# Patient Record
Sex: MALE
[De-identification: names, ages, dates, MRNs, and addresses within clinical notes are randomized per-mention and may not be internally consistent; named-entity substitution may affect disease eponyms.]

---

## 2018-12-09 ENCOUNTER — HOSPITAL ENCOUNTER (EMERGENCY)
Dept: HOSPITAL 31 - C.ER | Age: 38
Discharge: HOME | End: 2018-12-09
Payer: MEDICAID

## 2018-12-09 VITALS — RESPIRATION RATE: 20 BRPM

## 2018-12-09 VITALS
HEART RATE: 85 BPM | TEMPERATURE: 98.3 F | DIASTOLIC BLOOD PRESSURE: 85 MMHG | SYSTOLIC BLOOD PRESSURE: 136 MMHG | OXYGEN SATURATION: 98 %

## 2018-12-09 DIAGNOSIS — Z72.0: ICD-10-CM

## 2018-12-09 DIAGNOSIS — J01.90: Primary | ICD-10-CM

## 2018-12-09 LAB
ALBUMIN SERPL-MCNC: 4.5 G/DL (ref 3.5–5)
ALBUMIN/GLOB SERPL: 1.2 {RATIO} (ref 1–2.1)
ALT SERPL-CCNC: 27 U/L (ref 21–72)
AST SERPL-CCNC: 30 U/L (ref 17–59)
BASOPHILS # BLD AUTO: 0.1 K/UL (ref 0–0.2)
BASOPHILS NFR BLD: 1 % (ref 0–2)
BUN SERPL-MCNC: 9 MG/DL (ref 9–20)
CALCIUM SERPL-MCNC: 9.3 MG/DL (ref 8.6–10.4)
EOSINOPHIL # BLD AUTO: 0.4 K/UL (ref 0–0.7)
EOSINOPHIL NFR BLD: 5.2 % (ref 0–4)
ERYTHROCYTE [DISTWIDTH] IN BLOOD BY AUTOMATED COUNT: 14 % (ref 11.5–14.5)
GFR NON-AFRICAN AMERICAN: > 60
HGB BLD-MCNC: 15.9 G/DL (ref 12–18)
LYMPHOCYTES # BLD AUTO: 1.9 K/UL (ref 1–4.3)
LYMPHOCYTES NFR BLD AUTO: 25.8 % (ref 20–40)
MCH RBC QN AUTO: 27.2 PG (ref 27–31)
MCHC RBC AUTO-ENTMCNC: 33.4 G/DL (ref 33–37)
MCV RBC AUTO: 81.5 FL (ref 80–94)
MONOCYTES # BLD: 0.9 K/UL (ref 0–0.8)
MONOCYTES NFR BLD: 12.9 % (ref 0–10)
NEUTROPHILS # BLD: 4 K/UL (ref 1.8–7)
NEUTROPHILS NFR BLD AUTO: 55.1 % (ref 50–75)
NRBC BLD AUTO-RTO: 0 % (ref 0–2)
PLATELET # BLD: 241 K/UL (ref 130–400)
PMV BLD AUTO: 8.7 FL (ref 7.2–11.7)
RBC # BLD AUTO: 5.82 MIL/UL (ref 4.4–5.9)
WBC # BLD AUTO: 7.3 K/UL (ref 4.8–10.8)

## 2018-12-09 PROCEDURE — 99284 EMERGENCY DEPT VISIT MOD MDM: CPT

## 2018-12-09 PROCEDURE — 80053 COMPREHEN METABOLIC PANEL: CPT

## 2018-12-09 PROCEDURE — 87804 INFLUENZA ASSAY W/OPTIC: CPT

## 2018-12-09 PROCEDURE — 85025 COMPLETE CBC W/AUTO DIFF WBC: CPT

## 2018-12-09 PROCEDURE — 70481 CT ORBIT/EAR/FOSSA W/DYE: CPT

## 2018-12-09 NOTE — CT
Date of service: 



12/09/2018



PROCEDURE:  CT ORBITS WITH CONTRAST.



HISTORY:

Rt periobit edema;r/o orbit celluitis vs sinusitis



COMPARISON:

None available.



TECHNIQUE:

Following administration of intravenous iodinated contrast, axial CT 

images of the orbits were obtained. Coronal and sagittal reformats 

were generated.



Intravenous contrast dose: 100 mL Visipaque



Radiation dose:



Total exam DLP = 564.76 mGy-cm.



This CT exam was performed using one or more of the following dose 

reduction techniques: Automated exposure control, adjustment of the 

mA and/or kV according to patient size, and/or use of iterative 

reconstruction technique.



FINDINGS:



RIGHT ORBIT:



RIGHT BONY ORBIT:

Normal.



RIGHT INTRAORBITAL STRUCTURES:

Globe: Normal.



Extraocular muscles: Normal.



Post septal space: Normal.



Optic Nerve: Normal.



Lacrimal Apparatus: Normal.



RIGHT PRESEPTAL SOFT TISSUES:

There is mild infraorbital and pre maxillary.



LEFT ORBIT:



LEFT BONY ORBIT:

Normal.



LEFT INTRAORBITAL STRUCTURES:

Globe: Normal.



Extraocular muscles: Normal.



Post septal space: Normal.



Optic Nerve: Normal.



Lacrimal Apparatus: Normal.



LEFT PRESEPTAL SOFT TISSUES:

Normal.



OTHER:

There is moderate polypoid mucosal thickening in the left maxillary 

sinus with aerosolized secretions, moderate mucosal thickening in the 

ethmoid air cells and mild polypoid mucosal thickening in the left 

frontal and left sphenoid sinuses.  There is also mild polypoid 

mucosal thickening in the right maxillary sinus. 



IMPRESSION:

1.  Mild right infraorbital and pre maxillary soft tissue swelling 

which may represent periorbital cellulitis in the appropriate 

clinical setting.  No evidence for postseptal orbital cellulitis.  

Mild chronic right maxillary sinusitis. 



2.  Acute and/chronic left maxillary, ethmoid and frontal sinusitis 

in ostiomeatal obstruction pattern.

## 2018-12-09 NOTE — C.PDOC
History Of Present Illness





Patient is a 38 yr old male who is c/o flu-like symptoms throughout the week and

also c/o pain under his right eye since yesterday.  Pt states he has had no 

energy and has been very stuffy (nasal congestion) thoughout the whole week.  He

states he has felt feverish the whole week but didn't check his temperature at 

home.  Pt did not get a flu shot this yr.  Pt did have a cough a few days ago 

but that is better now.  Pt also w/ a sore throat a couple of days ago but 

that's improved.  The pain located at his right inferior orbit does radiate 

inferiorly to the maxillary sinus area.  No drainage of pus from his right eye 

and did not wake up with crust at right eye (and eyelids not stuck together upon

wakening). Pt has been taking Tylenol throughout the week w/o much 

effectiveness.  Did not take Tylenol today.





PMD:  None





/


Time Seen by Provider: 12/09/18 12:41


Chief Complaint (Nursing): Flu-like Symptoms


History Per: Patient


History/Exam Limitations: no limitations


Onset/Duration Of Symptoms: Days


Current Symptoms Are (Timing): Still Present





Past Medical History


Reviewed: Historical Data, Nursing Documentation, Vital Signs


Vital Signs: 





                                Last Vital Signs











Temp  98.1 F   12/09/18 12:34


 


Pulse  87   12/09/18 12:34


 


Resp  20   12/09/18 12:34


 


BP  120/82   12/09/18 12:34


 


Pulse Ox  99   12/09/18 12:34














- Medical History


PMH: Pneumonia


Other PMH: Headaches


Family History: States: Diabetes, Other


Other Family History: Cancer





- Social History


Hx Tobacco Use: Yes


Hx Alcohol Use: No


Hx Substance Use: No





Review Of Systems


Except As Marked, All Systems Reviewed And Found Negative.


Constitutional: Positive for: Fever


Eyes: Positive for: Pain


Cardiovascular: Negative for: Chest Pain


Respiratory: Positive for: Cough


Gastrointestinal: Negative for: Vomiting


Neurological: Positive for: Weakness





Physical Exam





- Physical Exam


Appears: Well, Non-toxic, Toxic


Skin: Other (erythema to right lower periorbital area)


Head: Atraumatic


Eye(s): bilateral: PERRL, EOMI, right: Eyelid Inflammation (lower eyelid 

inflamed), Other ((+) right lower periorbital edema and right inf bony orbital 

rim is very tender to touch)


Ear(s): Bilateral: Normal


Nose: Other ((+) edema of nasal mucosa)


Oral Mucosa: Moist


Tongue: Normal Appearing


Lips: Normal Appearing


Teeth: Normal Dentition


Gingiva: Normal Appearing


Throat: Normal


Neck: Normal


Lymphatic: Deferred


Chest: Symmetrical


Cardiovascular: Rhythm Regular


Respiratory: Normal Breath Sounds, No Rales, No Rhonchi, No Wheezing


Gastrointestinal/Abdominal: Normal Exam, Bowel Sounds, Soft, No Tenderness


Rectal: Deferred


Back: Normal Inspection


Extremity: Normal ROM


Extremity: Bilateral: Normal ROM


Pulses: Left Radial: Normal, Right Radial: Normal


Neurological/Psych: Oriented x3, Normal Motor





ED Course And Treatment





- Laboratory Results


Result Diagrams: 


                                 12/09/18 13:06





                                 12/09/18 13:06


O2 Sat by Pulse Oximetry: 99





Medical Decision Making


Medical Decision Making: 





Initial Impression:  


Right orbital swelling/pain.  Differential diagnosis includes but is not limited

to:  periorbital cellulitis, orbital cellulitis, sinusitis, influenza





Initial Plan:


Will get labs and check CT














Disposition


Counseled Patient/Family Regarding: Studies Performed, Diagnosis, Need For 

Followup, Rx Given





- Disposition


Referrals: 


Behin,Babak, MD [Staff Provider] - 


Disposition: HOME/ ROUTINE


Disposition Time: 14:56


Condition: STABLE


Additional Instructions: 


Mr. Lopez, thank you for letting us take care of you today.





Return to the ER if your symptoms worsen, or if any problems.





Take the medication listed below as prescribed.





Follow up with the Ear-Nose-Throat doctor (Dr. Behin).  His phone number is 

listed below to make an appointment.








Prescriptions: 


Amoxicillin/Clavulanate [Augmentin 875 MG-125 MG] 1 tab PO BID #20 tab


Ibuprofen [Motrin Tab] 1 tab PO TID PRN #30 tab


 PRN Reason: Pain, Moderate (4-7)


Instructions:  Sinusitis in Adults


Forms:  [employer] (English)


Print Language: ENGLISH





- POA


Present On Arrival: None





- Clinical Impression


Clinical Impression: 


 Sinusitis, acute

## 2018-12-10 ENCOUNTER — HOSPITAL ENCOUNTER (EMERGENCY)
Dept: HOSPITAL 31 - C.ER | Age: 38
Discharge: HOME | End: 2018-12-10
Payer: MEDICAID

## 2018-12-10 VITALS — SYSTOLIC BLOOD PRESSURE: 121 MMHG | HEART RATE: 87 BPM | OXYGEN SATURATION: 98 % | DIASTOLIC BLOOD PRESSURE: 74 MMHG

## 2018-12-10 VITALS — BODY MASS INDEX: 30.1 KG/M2

## 2018-12-10 VITALS — TEMPERATURE: 98.1 F | RESPIRATION RATE: 18 BRPM

## 2018-12-10 DIAGNOSIS — L03.213: Primary | ICD-10-CM

## 2018-12-10 LAB
BASOPHILS # BLD AUTO: 0.1 K/UL (ref 0–0.2)
BASOPHILS NFR BLD: 1.1 % (ref 0–2)
BUN SERPL-MCNC: 10 MG/DL (ref 9–20)
CALCIUM SERPL-MCNC: 9.3 MG/DL (ref 8.6–10.4)
EOSINOPHIL # BLD AUTO: 0.6 K/UL (ref 0–0.7)
EOSINOPHIL NFR BLD: 9.9 % (ref 0–4)
ERYTHROCYTE [DISTWIDTH] IN BLOOD BY AUTOMATED COUNT: 14.2 % (ref 11.5–14.5)
GFR NON-AFRICAN AMERICAN: > 60
HGB BLD-MCNC: 15.6 G/DL (ref 12–18)
LYMPHOCYTES # BLD AUTO: 1.7 K/UL (ref 1–4.3)
LYMPHOCYTES NFR BLD AUTO: 30.8 % (ref 20–40)
MCH RBC QN AUTO: 26.8 PG (ref 27–31)
MCHC RBC AUTO-ENTMCNC: 32.6 G/DL (ref 33–37)
MCV RBC AUTO: 82.1 FL (ref 80–94)
MONOCYTES # BLD: 0.9 K/UL (ref 0–0.8)
MONOCYTES NFR BLD: 15.5 % (ref 0–10)
NEUTROPHILS # BLD: 2.4 K/UL (ref 1.8–7)
NEUTROPHILS NFR BLD AUTO: 42.7 % (ref 50–75)
NRBC BLD AUTO-RTO: 0.1 % (ref 0–2)
PLATELET # BLD: 235 K/UL (ref 130–400)
PMV BLD AUTO: 8.8 FL (ref 7.2–11.7)
RBC # BLD AUTO: 5.83 MIL/UL (ref 4.4–5.9)
WBC # BLD AUTO: 5.6 K/UL (ref 4.8–10.8)

## 2018-12-10 PROCEDURE — 96374 THER/PROPH/DIAG INJ IV PUSH: CPT

## 2018-12-10 PROCEDURE — 80048 BASIC METABOLIC PNL TOTAL CA: CPT

## 2018-12-10 PROCEDURE — 87040 BLOOD CULTURE FOR BACTERIA: CPT

## 2018-12-10 PROCEDURE — 96375 TX/PRO/DX INJ NEW DRUG ADDON: CPT

## 2018-12-10 PROCEDURE — 99283 EMERGENCY DEPT VISIT LOW MDM: CPT

## 2018-12-10 PROCEDURE — 85025 COMPLETE CBC W/AUTO DIFF WBC: CPT

## 2018-12-10 NOTE — C.PDOC
History Of Present Illness





37 yo male w/o significant PMhx come in for evaluation of Right side eye pain 

associated with periorbital swelling and redness gradually developed for past 3 

days. Noted some yellow/greenish discharge. Pt admits, was seen here yesterday, 

received on Rx: Augmentin, denies taking yet. Otherwise, pt denies fever, 

chills, headache, dizziness, blurry vision, pain on eye movement, denies neck 

pain, drooling, toothache, earache, denies contact use, denies known eye trauma 

or injury, FB sensation, CP, SOB, cough, denies any other active complaints. 

Ambulate to Ed, not in any apparent distress.


Time Seen by Provider: 12/10/18 14:16


Chief Complaint (Nursing): Eye Problem


History Per: Patient





Past Medical History


Reviewed: Historical Data, Nursing Documentation, Vital Signs


Vital Signs: 





                                Last Vital Signs











Temp  98.1 F   12/10/18 13:24


 


Pulse  89   12/10/18 13:24


 


Resp  18   12/10/18 13:24


 


BP  112/79   12/10/18 13:24


 


Pulse Ox  97   12/10/18 13:24














- Medical History


PMH: No Chronic Diseases, Pneumonia


   Denies: Chronic Kidney Disease


Surgical History: No Surg Hx





- [facility] Procedures











INTRODUCE OF OTH THERAP SUBST INTO RESP TRACT, VIA OPENING (11/03/17)








Family History: States: Diabetes


   Denies: CAD





- Social History


Hx Tobacco Use: Yes


Hx Alcohol Use: Yes


Hx Substance Use: Yes





- Immunization History


Hx Tetanus Toxoid Vaccination: Yes


Hx Influenza Vaccination: No


Hx Pneumococcal Vaccination: No





Review Of Systems


Except As Marked, All Systems Reviewed And Found Negative.


Constitutional: Negative for: Fever, Chills


Eyes: Positive for: Eyelid Inflammation, Redness.  Negative for: Vision Change


ENT: Negative for: Ear Discharge, Nose Discharge, Nose Congestion, Mouth 

Swelling


Cardiovascular: Negative for: Chest Pain


Respiratory: Negative for: Cough, Shortness of Breath, Wheezing


Gastrointestinal: Negative for: Nausea, Vomiting, Abdominal Pain


Genitourinary: Negative for: Dysuria


Musculoskeletal: Negative for: Neck Pain, Back Pain


Skin: Negative for: Rash


Neurological: Negative for: Weakness, Numbness, Altered Mental Status, Dizziness





Physical Exam





- Physical Exam


Appears: Well, Non-toxic, No Acute Distress


Skin: Normal Color, Warm


Head: Normacephalic


Eye(s): bilateral: PERRL, EOMI (no pain or limitation on extraocular movemnet 

B/L), right: Other (mod tenderness with palpable induration inf lacrimal canal 

with extensive periorbital edema and erythema, scant greenish discharge noted.)


Ear(s): Bilateral: Normal


Nose: No Flaring, No Discharge


Oral Mucosa: Moist


Tongue: Normal Appearing


Lips: Normal Appearing


Teeth: No Tender To Palpation


Gingiva: No Abscess


Throat: No Erythema, No Drooling


Neck: Trachea Midline, Supple


Cardiovascular: Rhythm Regular, No Murmur, No JVD


Respiratory: No Decreased Breath Sounds, No Accessory Muscle Use, No Stridor, No

Wheezing


Gastrointestinal/Abdominal: Soft, No Tenderness, No Distention, No Guarding


Extremity: Normal ROM, No Deformity, No Swelling


Neurological/Psych: Oriented x3, Normal Speech





ED Course And Treatment





- Laboratory Results


Result Diagrams: 


                                 12/10/18 14:53





                                 12/10/18 14:53


Lab Interpretation: No Acute Changes


O2 Sat by Pulse Oximetry: 97


Pulse Ox Interpretation: Normal





- CT Scan/US


  ** CT Right orbit


Other Rad Studies (CT/US): Radiology Report Reviewed


CT/US Interpretation: Dictator : Nicole Rodriguez MD.   :  Approver 

: Nicole Rodriguez MD.  Approver2 :  Report Date : 12/09/2018 14:25:08.  My 

Comment :  

**********************************************************************

*************.  Date of service:  12/09/2018.  PROCEDURE:  CT ORBITS WITH 

CONTRAST.  HISTORY:  Rt periobit edema;r/o orbit celluitis vs sinusitis.  

COMPARISON:  None available.  TECHNIQUE:  Following administration of 

intravenous iodinated contrast, axial CT images of the orbits were obtained. 

Coronal and sagittal reformats were generated.  Intravenous contrast dose: 100 

mL Visipaque.  Radiation dose:  Total exam DLP = 564.76 mGy-cm.  This CT exam 

was performed using one or more of the following dose reduction techniques: 

Automated exposure control, adjustment of the mA and/or kV according to patient 

size, and/or use of iterative reconstruction technique.  FINDINGS:  RIGHT ORBIT:

 RIGHT BONY ORBIT:  Normal.  RIGHT INTRAORBITAL STRUCTURES:  Globe: Normal.  

Extraocular muscles: Normal.  Post septal space: Normal.  Optic Nerve: Normal.  

Lacrimal Apparatus: Normal.  RIGHT PRESEPTAL SOFT TISSUES:  There is mild 

infraorbital and pre maxillary.  LEFT ORBIT:  LEFT BONY ORBIT:  Normal.  LEFT IN

TRAORBITAL STRUCTURES:  Globe: Normal.  Extraocular muscles: Normal.  Post 

septal space: Normal.  Optic Nerve: Normal.  Lacrimal Apparatus: Normal.  LEFT 

PRESEPTAL SOFT TISSUES:  Normal.  OTHER:  There is moderate polypoid mucosal 

thickening in the left maxillary sinus with aerosolized secretions, moderate 

mucosal thickening in the ethmoid air cells and mild polypoid mucosal thickening

in the left frontal and left sphenoid sinuses.  There is also mild polypoid 

mucosal thickening in the right maxillary sinus.  IMPRESSION:  1.  Mild right 

infraorbital and pre maxillary soft tissue swelling which may represent 

periorbital cellulitis in the appropriate clinical setting.  No evidence for 

postseptal orbital cellulitis.  Mild chronic right maxillary sinusitis.  2.  

Acute and/chronic left maxillary, ethmoid and frontal sinusitis in ostiomeatal 

obstruction pattern.


Progress Note: Case discussed with Opht , results of blood work and CT 

Right orbit from 12/9/18 review, recommend discharge with outpt f/u now.  Pt was

OBS in ED for 3 hours and reports moderate improvement in sx.  on re-eval, pt is

afebrile, hemodynamicaly stable.  Non-toxic, tolerate Po well in ED.  Right eye:

mild improvement periorbital edema and erythema. No pain or limitation on 

extraocular movement.  ENT: no acute findings.  neck: Supple, (-) carodid bruits

B/L.  Lungs: CTA B/L, BS equal B/L.  neurologicaly intact.  Blood work review 

and appears without acute abnormalities, no acute leukocytosis or left shift.  

Pt rceived Zosyn vanco today.  Blood cx-pending.  Pt has clinical findings c/w 

Right periorbital celluliits.  Pt advised to F/U with  now for further 

evaluation.  Continue antibiotic as intiated yesterday ( AUgmentin).  return if 

any new changes.





Disposition


Counseled Patient/Family Regarding: Studies Performed, Diagnosis, Need For 

Followup, Rx Given





- Disposition


Referrals: 


Mika Cummings [Staff Provider] - 


Disposition: HOME/ ROUTINE


Disposition Time: 14:34


Condition: STABLE


Additional Instructions: 


FOLLOW UP WITH  NOW FOR FURTHER EVALUATION AND TREATMENT





Take antibiotic as initiated yesterday ( Augmentin)


Take medication as prescribed


Return to ED at any time if any worsening or new changes.


Prescriptions: 


Prednisone [Deltasone] 60 mg PO DAILY #9 tablet


Instructions:  Orbital Cellulitis


Forms:  [employer] (English)





- Clinical Impression


Clinical Impression: 


 Periorbital cellulitis of right eye

## 2022-01-30 ENCOUNTER — EMERGENCY (EMERGENCY)
Facility: HOSPITAL | Age: 42
LOS: 1 days | Discharge: HOME | End: 2022-01-30
Attending: EMERGENCY MEDICINE | Admitting: EMERGENCY MEDICINE
Payer: MEDICARE

## 2022-01-30 VITALS
HEART RATE: 104 BPM | HEIGHT: 67.5 IN | WEIGHT: 190.04 LBS | OXYGEN SATURATION: 98 % | SYSTOLIC BLOOD PRESSURE: 147 MMHG | RESPIRATION RATE: 16 BRPM | TEMPERATURE: 99 F | DIASTOLIC BLOOD PRESSURE: 82 MMHG

## 2022-01-30 VITALS
OXYGEN SATURATION: 99 % | DIASTOLIC BLOOD PRESSURE: 89 MMHG | HEART RATE: 82 BPM | RESPIRATION RATE: 16 BRPM | SYSTOLIC BLOOD PRESSURE: 142 MMHG

## 2022-01-30 LAB
ALBUMIN SERPL ELPH-MCNC: 3.3 G/DL — SIGNIFICANT CHANGE UP (ref 3.3–5)
ALP SERPL-CCNC: 96 U/L — SIGNIFICANT CHANGE UP (ref 40–120)
ALT FLD-CCNC: 33 U/L — SIGNIFICANT CHANGE UP (ref 10–45)
ANION GAP SERPL CALC-SCNC: 12 MMOL/L — SIGNIFICANT CHANGE UP (ref 5–17)
AST SERPL-CCNC: 21 U/L — SIGNIFICANT CHANGE UP (ref 10–40)
BASOPHILS # BLD AUTO: 0.06 K/UL — SIGNIFICANT CHANGE UP (ref 0–0.2)
BASOPHILS NFR BLD AUTO: 0.6 % — SIGNIFICANT CHANGE UP (ref 0–2)
BILIRUB SERPL-MCNC: 0.2 MG/DL — SIGNIFICANT CHANGE UP (ref 0.2–1.2)
BUN SERPL-MCNC: 15 MG/DL — SIGNIFICANT CHANGE UP (ref 7–23)
CALCIUM SERPL-MCNC: 8.8 MG/DL — SIGNIFICANT CHANGE UP (ref 8.4–10.5)
CHLORIDE SERPL-SCNC: 108 MMOL/L — SIGNIFICANT CHANGE UP (ref 96–108)
CO2 SERPL-SCNC: 25 MMOL/L — SIGNIFICANT CHANGE UP (ref 22–31)
CREAT SERPL-MCNC: 0.86 MG/DL — SIGNIFICANT CHANGE UP (ref 0.5–1.3)
EOSINOPHIL # BLD AUTO: 0.57 K/UL — HIGH (ref 0–0.5)
EOSINOPHIL NFR BLD AUTO: 5.4 % — SIGNIFICANT CHANGE UP (ref 0–6)
GLUCOSE SERPL-MCNC: 102 MG/DL — HIGH (ref 70–99)
HCT VFR BLD CALC: 43.6 % — SIGNIFICANT CHANGE UP (ref 39–50)
HGB BLD-MCNC: 14 G/DL — SIGNIFICANT CHANGE UP (ref 13–17)
IMM GRANULOCYTES NFR BLD AUTO: 0.4 % — SIGNIFICANT CHANGE UP (ref 0–1.5)
LACTATE SERPL-SCNC: 2 MMOL/L — SIGNIFICANT CHANGE UP (ref 0.7–2)
LACTATE SERPL-SCNC: 2.1 MMOL/L — HIGH (ref 0.7–2)
LIDOCAIN IGE QN: 108 U/L — SIGNIFICANT CHANGE UP (ref 73–393)
LYMPHOCYTES # BLD AUTO: 3.21 K/UL — SIGNIFICANT CHANGE UP (ref 1–3.3)
LYMPHOCYTES # BLD AUTO: 30.5 % — SIGNIFICANT CHANGE UP (ref 13–44)
MCHC RBC-ENTMCNC: 26.2 PG — LOW (ref 27–34)
MCHC RBC-ENTMCNC: 32.1 GM/DL — SIGNIFICANT CHANGE UP (ref 32–36)
MCV RBC AUTO: 81.5 FL — SIGNIFICANT CHANGE UP (ref 80–100)
MONOCYTES # BLD AUTO: 1.12 K/UL — HIGH (ref 0–0.9)
MONOCYTES NFR BLD AUTO: 10.6 % — SIGNIFICANT CHANGE UP (ref 2–14)
NEUTROPHILS # BLD AUTO: 5.52 K/UL — SIGNIFICANT CHANGE UP (ref 1.8–7.4)
NEUTROPHILS NFR BLD AUTO: 52.5 % — SIGNIFICANT CHANGE UP (ref 43–77)
NRBC # BLD: 0 /100 WBCS — SIGNIFICANT CHANGE UP (ref 0–0)
PLATELET # BLD AUTO: 340 K/UL — SIGNIFICANT CHANGE UP (ref 150–400)
POTASSIUM SERPL-MCNC: 3.4 MMOL/L — LOW (ref 3.5–5.3)
POTASSIUM SERPL-SCNC: 3.4 MMOL/L — LOW (ref 3.5–5.3)
PROT SERPL-MCNC: 7.8 G/DL — SIGNIFICANT CHANGE UP (ref 6–8.3)
RBC # BLD: 5.35 M/UL — SIGNIFICANT CHANGE UP (ref 4.2–5.8)
RBC # FLD: 15.4 % — HIGH (ref 10.3–14.5)
SARS-COV-2 RNA SPEC QL NAA+PROBE: SIGNIFICANT CHANGE UP
SODIUM SERPL-SCNC: 145 MMOL/L — SIGNIFICANT CHANGE UP (ref 135–145)
WBC # BLD: 10.52 K/UL — HIGH (ref 3.8–10.5)
WBC # FLD AUTO: 10.52 K/UL — HIGH (ref 3.8–10.5)

## 2022-01-30 PROCEDURE — 99242 OFF/OP CONSLTJ NEW/EST SF 20: CPT

## 2022-01-30 PROCEDURE — 93005 ELECTROCARDIOGRAM TRACING: CPT

## 2022-01-30 PROCEDURE — 71045 X-RAY EXAM CHEST 1 VIEW: CPT | Mod: 26

## 2022-01-30 PROCEDURE — 74019 RADEX ABDOMEN 2 VIEWS: CPT

## 2022-01-30 PROCEDURE — 83690 ASSAY OF LIPASE: CPT

## 2022-01-30 PROCEDURE — 96361 HYDRATE IV INFUSION ADD-ON: CPT

## 2022-01-30 PROCEDURE — 86900 BLOOD TYPING SEROLOGIC ABO: CPT

## 2022-01-30 PROCEDURE — 74177 CT ABD & PELVIS W/CONTRAST: CPT | Mod: MA

## 2022-01-30 PROCEDURE — 96374 THER/PROPH/DIAG INJ IV PUSH: CPT | Mod: XU

## 2022-01-30 PROCEDURE — 85025 COMPLETE CBC W/AUTO DIFF WBC: CPT

## 2022-01-30 PROCEDURE — 86901 BLOOD TYPING SEROLOGIC RH(D): CPT

## 2022-01-30 PROCEDURE — 80053 COMPREHEN METABOLIC PANEL: CPT

## 2022-01-30 PROCEDURE — 87635 SARS-COV-2 COVID-19 AMP PRB: CPT

## 2022-01-30 PROCEDURE — 99284 EMERGENCY DEPT VISIT MOD MDM: CPT | Mod: 25

## 2022-01-30 PROCEDURE — 74019 RADEX ABDOMEN 2 VIEWS: CPT | Mod: 26

## 2022-01-30 PROCEDURE — 71045 X-RAY EXAM CHEST 1 VIEW: CPT

## 2022-01-30 PROCEDURE — 74177 CT ABD & PELVIS W/CONTRAST: CPT | Mod: 26,MA

## 2022-01-30 PROCEDURE — 96375 TX/PRO/DX INJ NEW DRUG ADDON: CPT

## 2022-01-30 PROCEDURE — 83605 ASSAY OF LACTIC ACID: CPT

## 2022-01-30 PROCEDURE — 96376 TX/PRO/DX INJ SAME DRUG ADON: CPT

## 2022-01-30 PROCEDURE — 86850 RBC ANTIBODY SCREEN: CPT

## 2022-01-30 PROCEDURE — 36415 COLL VENOUS BLD VENIPUNCTURE: CPT

## 2022-01-30 PROCEDURE — 93010 ELECTROCARDIOGRAM REPORT: CPT

## 2022-01-30 PROCEDURE — 99285 EMERGENCY DEPT VISIT HI MDM: CPT

## 2022-01-30 RX ORDER — ONDANSETRON 8 MG/1
4 TABLET, FILM COATED ORAL ONCE
Refills: 0 | Status: COMPLETED | OUTPATIENT
Start: 2022-01-30 | End: 2022-01-30

## 2022-01-30 RX ORDER — MORPHINE SULFATE 50 MG/1
4 CAPSULE, EXTENDED RELEASE ORAL ONCE
Refills: 0 | Status: DISCONTINUED | OUTPATIENT
Start: 2022-01-30 | End: 2022-01-30

## 2022-01-30 RX ORDER — SODIUM CHLORIDE 9 MG/ML
2700 INJECTION INTRAMUSCULAR; INTRAVENOUS; SUBCUTANEOUS ONCE
Refills: 0 | Status: COMPLETED | OUTPATIENT
Start: 2022-01-30 | End: 2022-01-30

## 2022-01-30 RX ADMIN — ONDANSETRON 4 MILLIGRAM(S): 8 TABLET, FILM COATED ORAL at 14:00

## 2022-01-30 RX ADMIN — SODIUM CHLORIDE 2700 MILLILITER(S): 9 INJECTION INTRAMUSCULAR; INTRAVENOUS; SUBCUTANEOUS at 15:30

## 2022-01-30 RX ADMIN — MORPHINE SULFATE 4 MILLIGRAM(S): 50 CAPSULE, EXTENDED RELEASE ORAL at 16:00

## 2022-01-30 RX ADMIN — MORPHINE SULFATE 4 MILLIGRAM(S): 50 CAPSULE, EXTENDED RELEASE ORAL at 14:00

## 2022-01-30 RX ADMIN — SODIUM CHLORIDE 2700 MILLILITER(S): 9 INJECTION INTRAMUSCULAR; INTRAVENOUS; SUBCUTANEOUS at 14:00

## 2022-01-30 RX ADMIN — MORPHINE SULFATE 4 MILLIGRAM(S): 50 CAPSULE, EXTENDED RELEASE ORAL at 15:30

## 2022-01-30 RX ADMIN — MORPHINE SULFATE 4 MILLIGRAM(S): 50 CAPSULE, EXTENDED RELEASE ORAL at 14:30

## 2022-01-30 NOTE — ED PROVIDER NOTE - CARE PLAN
1 Principal Discharge DX:	SMAS (superior mesenteric artery syndrome)  Secondary Diagnosis:	Inguinal hernia

## 2022-01-30 NOTE — ED ADULT NURSE NOTE - OBJECTIVE STATEMENT
pt a&ox3 ambulatory to ED c/o left sided groin pain x1 week. Per pt, he noticed swelling to area and does a lot of pushing and lifting at work, pt states he thinks it is a hernia, pain is worse with urinating and bowel movements. Denies hematuria, denies n/v/d, fever/chills. Denies cp, sob.

## 2022-01-30 NOTE — ED PROVIDER NOTE - CARE PROVIDER_API CALL
Holland Turner)  ColonRectal Surgery; Surgery  [address], [address]  [place], [place] [id number]  Phone: [phone]  Fax: [phone]  Follow Up Time:

## 2022-01-30 NOTE — CONSULT NOTE ADULT - TIME BILLING
Discussion regarding all options and risks; discussed with Medicine; all imaging and lab values reviewed

## 2022-01-30 NOTE — ED PROVIDER NOTE - CLINICAL SUMMARY MEDICAL DECISION MAKING FREE TEXT BOX
Dr. Ribeiro: 41M no PMHx has noticed left groin swelling intermittent for several weeks, lifts heavy things at work, for the past week noticed left groin bulge not able to be reduced, pain worse x 1-2 days and today unable to have BM or urinate so came in. +nausea, no vomiting, no fevers or chills. No chest pain or sob. On exam pt is well appearing but in mild distress due to pain. Dry mucosa, RRR, CTAB, abdo soft with LLQ ttp, +left inguinal hernia, unable to reduce. Concern for incarcerated inguinal hernia, out for 1 week, will d/w surgery, labs, CT, xray r/o SBO. Dr. Ribeiro: 41M no PMHx has noticed left groin swelling intermittent for several weeks, lifts heavy things at work, for the past week noticed left groin bulge not able to be reduced, pain worse x 1-2 days and today unable to have BM or urinate so came in. +nausea, no vomiting, no fevers or chills. No chest pain or sob. On exam pt is well appearing but in mild distress due to pain. Dry mucosa, RRR, CTAB, abdo soft with LLQ ttp, +left inguinal hernia, unable to reduce. Concern for incarcerated inguinal hernia, out for 1 week, will d/w surgery, labs, CT, xray r/o SBO.  Ice pack placed on hernia

## 2022-01-30 NOTE — ED ADULT TRIAGE NOTE - AS HEIGHT TYPE
[FreeTextEntry1] : hypogonadism\par unclear genetic eval\par gulshan had 2 failed TESE - Samira and Raanan Ehsan\par now with new wife\par clomid 50 qod\par arimidex 1mg twice weekly\par must bring records\par f/u 6weeks
stated

## 2022-01-30 NOTE — ED PROVIDER NOTE - PATIENT PORTAL LINK FT
You can access the FollowMyHealth Patient Portal offered by [facility] by registering at the following website: [email or web address]. By joining [employer]’s FollowMyHealth portal, you will also be able to view your health information using other applications (apps) compatible with our system.

## 2022-01-30 NOTE — ED ADULT NURSE REASSESSMENT NOTE - NS ED NURSE REASSESS COMMENT FT1
pt awaiting consult by Dr. Turner at this time. Pt aware as to plan of care, medicated and given warm blanket for increased comfort. Significant other, Luz Maria Woods called and updated per pt request. Will continue to monitor.

## 2022-01-30 NOTE — ED PROVIDER NOTE - INGUINAL REGION
+nonreducible left inguinal hernia, +TTP. no skin changes noted./LEFT SIDE SWELLING/LEFT SIDE TENDERNESS

## 2022-01-30 NOTE — ED ADULT TRIAGE NOTE - ARRIVAL FROM
Reason For Visit  True Schmitt is here today for a nurse visit for TB reading. Current Meds   1. Aspirin 81 MG TABS; TAKE 1 TABLET DAILY; Therapy: (Recorded:12Nov2016) to Recorded   2. BuPROPion HCl ER (XL) 150 MG Oral Tablet Extended Release 24 Hour; TAKE 1   TABLET DAILY; Therapy: 53QWE0065 to [phone])  Requested for: 41WCO1001; Last   Rx:[id number] Ordered   3. Levothyroxine Sodium 150 MCG Oral Tablet; take one tablet by mouth one time daily; Therapy: 42EPB7041 to (Evaluate:85Mcj0752)  Requested for: 10LDM7814; Last   Rx:[id number] Ordered   4. Levothyroxine Sodium 175 MCG Oral Tablet; one tablet daily 2 days a week the other   days use 150 mcg; Therapy: 29RZO4594 to (Last Rx:05Jun2018)  Requested for: 65WIY7272 Ordered   5. Lidocaine 5 % External Patch; APPLY 1 PATCH TO THE AFFECTED AREA AND LEAVE IN   PLACE FOR 12 HOURS, THEN REMOVE AND LEAVE OFF FOR 12 HOURS; Therapy: 26YYI8875 to (Lyudmila Mack)  Requested for: 53NPW6279; Last   Rx:[id number] Ordered   6. Naproxen 500 MG Oral Tablet; TAKE 1 TABLET 3 TIMES DAILY AS NEEDED; Therapy: 54YVS9603 to (Evaluate:84Tgu2602)  Requested for: 12Apr2018; Last   Rx:[id number] Ordered   7. Pantoprazole Sodium 20 MG Oral Tablet Delayed Release; TAKE ONE TABLET BY   MOUTH TWICE DAILY; Therapy: 44JNM4797 to (Evaluate:17Alm7590)  Requested for: 11Aug2018; Last   Rx:[id number] Ordered   8. Senexon-S 8.6-50 MG Oral Tablet; TAKE ONE OR TWO TABLETS BY MOUTH DAILY TO   PREVENT CONSTIPATION; Therapy: 16MML0625 to (Evaluate:47Avp0625)  Requested for: 84Ygr1749; Last   Rx:[id number] Ordered   9. Senna-Docusate Sodium 8.6-50 MG Oral Tablet; TAKE 1 OR 2 TABLETS DAILY TO   PREVENT CONSTIPATION; Therapy: 44UTM1012 to (Evaluate:34Tjx3981)  Requested for: 08GXG0644; Last   Rx:[id number] Ordered   10. Simvastatin 40 MG Oral Tablet; take one tablet by mouth one time daily; Therapy: 63XKS9897 to (Evaluate:52Ydf6659)  Requested for: 19GBX6810;  Last Home Rx: [id number] Ordered   11. Sucralfate 1 GM Oral Tablet; TAKE ONE TABLET BY MOUTH EVERY TWELVE HOURS; Therapy: 55Upa8960 to [phone])  Requested for: 21TQP4323; Last    PP:[id number] Ordered   12. Venlafaxine HCl  MG Oral Capsule Extended Release 24 Hour; TAKE ONE    CAPSULE BY MOUTH ONE TIME DAILY; Therapy: 51SXC4678 to (Evaluate:34Mym0616)  Requested for: 82XMV6694; Last    Rx:[id number] Ordered    Allergies  Demerol TABS    Assessment   1. Encounter for PPD skin test reading (Z11.1)    Plan    Patient Instructions RTC on 8-25-18 for PPD Read  0mm measured, No induration/erythemia  PPD/TB negative. Signatures   Electronically signed by : ESTEFANI Bassett;  Aug 25 2018 10:31AM CST

## 2022-01-30 NOTE — ED PROVIDER NOTE - PROGRESS NOTE DETAILS
TISH Walker: labs reviewed, Abd CT results reviewed with Dr. Woo. Pt was placed in Trendelenburg and apply ice to the hernia per Dr. woo's request.  He evaluated pt in the ED and the hernia was reduced, he will f/u with patient in the office later this week.

## 2022-01-30 NOTE — ED PROVIDER NOTE - OBJECTIVE STATEMENT
42 y/o M with no reported PMH presents to the ED with c/o left groin pain and swelling x 1 week, worsened few days ago, reports pain is worse today with urinations and BMs. 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 +nausea with pain. Denies vomiting, f/c, hematuria, CP, SOB or prior abd surgeries

## 2022-01-30 NOTE — ED PROVIDER NOTE - ATTENDING CONTRIBUTION TO CARE
Dr. Ribeiro: I performed a face to face bedside interview with patient regarding history of present illness, review of symptoms and past medical history. I completed an independent physical exam.  I have discussed patient's plan of care with PA.   I agree with note as stated above, having amended the EMR as needed to reflect my findings.   This includes HISTORY OF PRESENT ILLNESS, HIV, PAST MEDICAL/SURGICAL/FAMILY/SOCIAL HISTORY, ALLERGIES AND HOME MEDICATIONS, REVIEW OF SYSTEMS, PHYSICAL EXAM, and any PROGRESS NOTES during the time I functioned as the attending physician for this patient.    see mdm

## 2022-01-30 NOTE — CONSULT NOTE ADULT - ASSESSMENT
IMPRESSION: Reduced LIH    PLAN: Appointment in my office this week           Repair of LIH in near future

## 2022-01-30 NOTE — ED PROVIDER NOTE - NSFOLLOWUPINSTRUCTIONS_ED_ALL_ED_FT
Follow up with Dr. Turner later this week as discussed. Take the copy of your test results you were given in the emergency room for your doctor to review.     Stay hydrated    Return to the ER if your symptoms worsen or for any other medical emergencies  ******************    Inguinal Hernia    WHAT YOU NEED TO KNOW:    An inguinal hernia happens when organs or abdominal tissue push through a weak spot in the abdominal wall. The abdominal wall is made of fat and muscle. It holds the intestines in place. The hernia may contain fluid, tissue from the abdomen, or part of an organ (such as an intestine).    Inguinal Hernia         DISCHARGE INSTRUCTIONS:    Return to the emergency department if:   •You have severe abdominal pain with nausea and vomiting.       •Your abdomen is larger than usual.       •Your hernia gets bigger or is purple or blue.       •You see blood in your bowel movements.      •You feel weak, dizzy, or faint.       Contact your healthcare provider if:   •You have a fever.      •You have questions or concerns about your condition or care.      Medicine: You may need the following:   •NSAIDs, such as ibuprofen, help decrease swelling, pain, and fever. NSAIDs can cause stomach bleeding or kidney problems in certain people. If you take blood thinner medicine, always ask your healthcare provider if NSAIDs are safe for you. Always read the medicine label and follow directions.      •Take your medicine as directed. Contact your healthcare provider if you think your medicine is not helping or if you have side effects. Tell him or her if you are allergic to any medicine. Keep a list of the medicines, vitamins, and herbs you take. Include the amounts, and when and why you take them. Bring the list or the pill bottles to follow-up visits. Carry your medicine list with you in case of an emergency.      Follow up with your healthcare provider as directed: Write down your questions so you remember to ask them during your visits.    Manage your symptoms and prevent another hernia:   •Do not lift anything heavy. Heavy lifting can make your hernia worse or cause another hernia. Ask your healthcare provider how much is safe for you to lift.       •Drink liquids as directed. Liquids may prevent constipation and straining during a bowel movement. Ask how much liquid to drink each day and which liquids are best for you.       •Eat foods high in fiber. Fiber may prevent constipation and straining during a bowel movement. Foods that contain fiber include fruits, vegetables, beans, lentils, and whole grains.       •Maintain a healthy weight. If you are overweight, weight loss may prevent your hernia from getting worse. It may also prevent another hernia. Talk to your healthcare provider about exercise and how to lose weight safely if you are overweight.       •Do not smoke. Nicotine and other chemicals in cigarettes and cigars can weaken the abdominal wall. This may increase your risk for another hernia. Ask your healthcare provider for information if you currently smoke and need help to quit. E-cigarettes or smokeless tobacco still contain nicotine. Talk to your healthcare provider before you use these products.

## 2022-01-31 ENCOUNTER — EMERGENCY (EMERGENCY)
Facility: HOSPITAL | Age: 42
LOS: 1 days | Discharge: HOME | End: 2022-01-31
Attending: EMERGENCY MEDICINE | Admitting: EMERGENCY MEDICINE
Payer: SELF-PAY

## 2022-01-31 VITALS
WEIGHT: 190.04 LBS | DIASTOLIC BLOOD PRESSURE: 85 MMHG | OXYGEN SATURATION: 100 % | SYSTOLIC BLOOD PRESSURE: 130 MMHG | RESPIRATION RATE: 18 BRPM | HEART RATE: 94 BPM | HEIGHT: 67.5 IN | TEMPERATURE: 98 F

## 2022-01-31 PROCEDURE — 99283 EMERGENCY DEPT VISIT LOW MDM: CPT

## 2022-01-31 PROCEDURE — 99282 EMERGENCY DEPT VISIT SF MDM: CPT

## 2022-01-31 RX ORDER — ACETAMINOPHEN 500 MG
975 TABLET ORAL ONCE
Refills: 0 | Status: COMPLETED | OUTPATIENT
Start: 2022-01-31 | End: 2022-01-31

## 2022-01-31 RX ADMIN — Medication 975 MILLIGRAM(S): at 09:58

## 2022-01-31 NOTE — ED PROVIDER NOTE - NSFOLLOWUPINSTRUCTIONS_ED_ALL_ED_FT
Hernia    A hernia is when fat or the intestines push through a weak area in the abdominal wall. This can occur around the belly button (umbilical hernia) or where the leg meets the lower abdomen (inguinal hernia). This creates a rounded lump (bulge). Hernias that can be pushed back into the belly are called reducible and those that cannot are called incarcerated. Hernias that are not reducible and lose their blood supply are called strangulated and require emergency surgery. Hernias can be caused or worsened when straining during bowel movements or lifting heavy objects as well as coughing or sneezing. Surgery may be helpful in treating this condition so follow up with a surgeon. Get supportive underwear as well, as we discussed.     SEEK IMMEDIATE MEDICAL CARE IF YOU HAVE ANY OF THE FOLLOWING SYMPTOMS: worsening abdominal pain, change in color over the hernia, nausea/vomiting, or inability to have a bowel movement or pass gas.

## 2022-01-31 NOTE — ED ADULT NURSE NOTE - NSIMPLEMENTINTERV_GEN_ALL_ED
Implemented All Universal Safety Interventions:  [place] to call system. Call bell, personal items and telephone within reach. Instruct patient to call for assistance. Room bathroom lighting operational. Non-slip footwear when patient is off stretcher. Physically safe environment: no spills, clutter or unnecessary equipment. Stretcher in lowest position, wheels locked, appropriate side rails in place.

## 2022-01-31 NOTE — ED PROVIDER NOTE - CLINICAL SUMMARY MEDICAL DECISION MAKING FREE TEXT BOX
41M presents to the ED c/o pain to the left inguinum. He was here yesterday for same where a fat containing inguinal hernia was reducible at bedside. Advised to f/u outpt with Dr Woo after he was assessed. Pt states after he left, when he went home, it popped back out. He has been in pain all evening and this morning. He called and left message for Dr Woo and returned. No vomiting. No dysuria. No fever.     Exam as stated. No emergency noted. Called Dr oWo to inform him of the patient's return. Pt okay for d/c and he will f/u outpt. Pain improved after reduction. Advised and educated patient on how to reduce and reasons for return (if unable). Pt will f/u outpt with Dr Woo.     Worsening, continued or ANY new concerning symptoms return to the emergency department.

## 2022-01-31 NOTE — ED PROVIDER NOTE - GASTROINTESTINAL, MLM
Abdomen soft, non-tender, no guarding. Left inguinal hernia is small and palpable. it easily reduces. No scrotal pain or swelling. No erythema.

## 2022-01-31 NOTE — ED PROVIDER NOTE - PATIENT PORTAL LINK FT
You can access the FollowMyHealth Patient Portal offered by [facility] by registering at the following website: [email or web address]. By joining [facility]’s FollowMyHealth portal, you will also be able to view your health information using other applications (apps) compatible with our system.

## 2022-01-31 NOTE — ED PROVIDER NOTE - CARE PROVIDER_API CALL
Holland Turner)  ColonRectal Surgery; Surgery  [address], [address]  [place], [place] [id number]  Phone: [phone]  Fax: [phone]  Follow Up Time:

## 2022-01-31 NOTE — ED PROVIDER NOTE - OBJECTIVE STATEMENT
41M presents to the ED c/o pain to the left inguinum. He was here yesterday for same where a fat containing inguinal hernia was reducible at bedside. Advised to f/u outpt with Dr Woo after he was assessed. Pt states after he left, when he went home, it popped back out. He has been in pain all evening and this morning. He called and left message for Dr Woo and returned. No vomiting. No dysuria. No fever.

## 2023-05-23 NOTE — ED ADULT TRIAGE NOTE - BP NONINVASIVE DIASTOLIC (MM HG)
85
Bed in lowest position, wheels locked, appropriate side rails in place/Call bell, personal items and telephone in reach/Instruct patient to call for assistance before getting out of bed or chair/Non-slip footwear when patient is out of bed/[place] to call system/Physically safe environment - no spills, clutter or unnecessary equipment/Purposeful Proactive Rounding/Room/bathroom lighting operational, light cord in reach